# Patient Record
Sex: MALE | Race: WHITE | NOT HISPANIC OR LATINO | Employment: FULL TIME | URBAN - METROPOLITAN AREA
[De-identification: names, ages, dates, MRNs, and addresses within clinical notes are randomized per-mention and may not be internally consistent; named-entity substitution may affect disease eponyms.]

---

## 2021-04-08 DIAGNOSIS — Z23 ENCOUNTER FOR IMMUNIZATION: ICD-10-CM

## 2022-12-28 ENCOUNTER — OFFICE VISIT (OUTPATIENT)
Dept: URGENT CARE | Age: 32
End: 2022-12-28

## 2022-12-28 VITALS — OXYGEN SATURATION: 95 % | RESPIRATION RATE: 12 BRPM | TEMPERATURE: 97.8 F | HEART RATE: 106 BPM

## 2022-12-28 DIAGNOSIS — J02.9 SORE THROAT: ICD-10-CM

## 2022-12-28 DIAGNOSIS — J02.0 STREP PHARYNGITIS: Primary | ICD-10-CM

## 2022-12-28 LAB — S PYO AG THROAT QL: POSITIVE

## 2022-12-28 RX ORDER — BUPRENORPHINE AND NALOXONE 2; .5 MG/1; MG/1
FILM, SOLUBLE BUCCAL; SUBLINGUAL
COMMUNITY
Start: 2022-12-19

## 2022-12-28 RX ORDER — CLINDAMYCIN HYDROCHLORIDE 300 MG/1
300 CAPSULE ORAL 3 TIMES DAILY
Qty: 30 CAPSULE | Refills: 0 | Status: SHIPPED | OUTPATIENT
Start: 2022-12-28 | End: 2023-01-07

## 2022-12-28 NOTE — PROGRESS NOTES
[facility]        NAME: Jordana Knox III is a 34 y o  male  : 1990    MRN: [id number]  DATE: 2022  TIME: 11:01 AM    Assessment and Plan   Strep pharyngitis [J02 0]  1  Strep pharyngitis  clindamycin (CLEOCIN) 300 MG capsule      2  Sore throat  POCT rapid strepA            Patient Instructions   Clindamycin 3 times a day for 10 days  Tylenol/Motrin as needed for pain/fever  Throat lozenge salt water gargles, or honey for discomfort  Increase fluid intake   Discard toothbrush 24 hours after starting antibiotic and again after finishing antibiotics  Follow up with your PCP for worsening symptoms      Follow up with PCP in 3-5 days  Proceed to  ER if symptoms worsen  Chief Complaint     Chief Complaint   Patient presents with   • Sore Throat   • Fever     Fever on Monday and chills         History of Present Illness       Patient is a 79-year-old male presenting with sore throat, headache, and chills for the past 2 days  He had a fever of 100 5  He had 2 negative COVID test at home  He is alternating Tylenol and ibuprofen for symptom relief  He is tolerating p o  intake with difficulty swallowing  Review of Systems   Review of Systems   Constitutional: Positive for chills and fever  Negative for activity change  HENT: Positive for sore throat  Negative for congestion, rhinorrhea and sinus pain  Respiratory: Negative for cough  Gastrointestinal: Negative for abdominal pain, diarrhea, nausea and vomiting  Skin: Negative for rash  Neurological: Positive for headaches           Current Medications       Current Outpatient Medications:   •  clindamycin (CLEOCIN) 300 MG capsule, Take 1 capsule (300 mg total) by mouth 3 (three) times a day for 10 days, Disp: 30 capsule, Rfl: 0  •  buprenorphine-naloxone (Suboxone) 2-0 5 mg, , Disp: , Rfl:     Current Allergies     Allergies as of 2022 - Reviewed 2022   Allergen Reaction Noted   • Penicillins Hives 12/28/2022            The following portions of the patient's history were reviewed and updated as appropriate: allergies, current medications, past family history, past medical history, past social history, past surgical history and problem list      No past medical history on file  No past surgical history on file  No family history on file  Medications have been verified  Objective   Pulse (!) 106   Temp 97 8 °F (36 6 °C) (Temporal)   Resp 12   SpO2 95%      Rapid strep positive  Physical Exam     Physical Exam  Vitals reviewed  Constitutional:       General: He is awake  He is not in acute distress  Appearance: Normal appearance  He is normal weight  HENT:      Head: Normocephalic  Right Ear: Hearing normal       Left Ear: Hearing normal       Nose: Nose normal       Mouth/Throat:      Pharynx: Pharyngeal swelling, oropharyngeal exudate and posterior oropharyngeal erythema present  Tonsils: 2+ on the right  2+ on the left  Cardiovascular:      Rate and Rhythm: Tachycardia present  Pulmonary:      Effort: Pulmonary effort is normal       Breath sounds: Normal breath sounds  Skin:     General: Skin is warm and moist    Neurological:      General: No focal deficit present  Mental Status: He is alert and oriented to person, place, and time  Psychiatric:         Behavior: Behavior is cooperative

## 2024-02-20 ENCOUNTER — OFFICE VISIT (OUTPATIENT)
Dept: URGENT CARE | Age: 34
End: 2024-02-20
Payer: COMMERCIAL

## 2024-02-20 VITALS
SYSTOLIC BLOOD PRESSURE: 140 MMHG | TEMPERATURE: 99.7 F | HEART RATE: 88 BPM | OXYGEN SATURATION: 100 % | DIASTOLIC BLOOD PRESSURE: 88 MMHG | BODY MASS INDEX: 30.8 KG/M2 | RESPIRATION RATE: 22 BRPM | WEIGHT: 220 LBS | HEIGHT: 71 IN

## 2024-02-20 DIAGNOSIS — J02.9 SORE THROAT: Primary | ICD-10-CM

## 2024-02-20 LAB — S PYO AG THROAT QL: NEGATIVE

## 2024-02-20 PROCEDURE — 87070 CULTURE OTHR SPECIMN AEROBIC: CPT

## 2024-02-20 PROCEDURE — 99213 OFFICE O/P EST LOW 20 MIN: CPT

## 2024-02-20 PROCEDURE — 87147 CULTURE TYPE IMMUNOLOGIC: CPT

## 2024-02-20 NOTE — PROGRESS NOTES
[facility]        NAME: Tank Gonzalez III is a 33 y.o. male  : 1990    MRN: [id number]  DATE: 2024  TIME: 8:43 AM    Assessment and Plan   Sore throat [J02.9]  1. Sore throat  POCT rapid strepA    Throat culture        In office strep test negative.No abx therapy indicated at this time.  Center criteria not met. Counseled patient on supportive therapy with plenty of fluids, tea with honey, throat lozenges. Can use saline nasal irrigation.  May take Tylenol or Motrin as needed for pain.May use decongestants as directed.    Patient Instructions       Follow up with PCP in 3-5 days.  Proceed to  ER if symptoms worsen.    Chief Complaint     Chief Complaint   Patient presents with   • Sore Throat     Pt has sore throat symptoms that started yesterday. Fever and chills.          History of Present Illness       Patient is a 33-year-old male presenting with 1 day of sore throat and mild congestion.  Patient states he took Motrin with minimal relief.  He denies cough, chest pain, shortness of breath, nausea, vomiting, diarrhea.  Patient states he has 2 toddlers at home which attended  and are sick on and off.            Sore Throat   Associated symptoms include congestion. Pertinent negatives include no ear pain, shortness of breath or stridor.       Review of Systems   Review of Systems   Constitutional:  Negative for chills and fever.   HENT:  Positive for congestion and sore throat. Negative for ear pain.    Respiratory:  Negative for shortness of breath, wheezing and stridor.    All other systems reviewed and are negative.        Current Medications       Current Outpatient Medications:   •  buprenorphine-naloxone (Suboxone) 2-0.5 mg, , Disp: , Rfl:     Current Allergies     Allergies as of 2024 - Reviewed 2024   Allergen Reaction Noted   • Penicillins Hives 2022            The following portions of the patient's history were reviewed and updated as  "appropriate: allergies, current medications, past family history, past medical history, past social history, past surgical history and problem list.     No past medical history on file.    No past surgical history on file.    No family history on file.      Medications have been verified.        Objective   /88   Pulse 88   Temp 99.7 °F (37.6 °C)   Resp 22   Ht 5' 11\" (1.803 m)   Wt 99.8 kg (220 lb)   SpO2 100%   BMI 30.68 kg/m²   No LMP for male patient.       Physical Exam     Physical Exam  Vitals and nursing note reviewed.   Constitutional:       General: He is not in acute distress.     Appearance: He is well-developed. He is not ill-appearing.   HENT:      Right Ear: Tympanic membrane normal.      Left Ear: Tympanic membrane normal.      Nose: Congestion and rhinorrhea present.      Mouth/Throat:      Mouth: Mucous membranes are moist.      Pharynx: Posterior oropharyngeal erythema present. No pharyngeal swelling or oropharyngeal exudate.      Tonsils: No tonsillar exudate. 1+ on the right. 1+ on the left.   Cardiovascular:      Rate and Rhythm: Normal rate and regular rhythm.   Pulmonary:      Effort: Pulmonary effort is normal.      Breath sounds: Normal breath sounds.   Abdominal:      General: Bowel sounds are normal.      Palpations: Abdomen is soft.   Musculoskeletal:      Cervical back: Normal range of motion.   Lymphadenopathy:      Cervical: No cervical adenopathy.   Skin:     General: Skin is warm and dry.   Neurological:      Mental Status: He is alert.                   "

## 2024-02-20 NOTE — PATIENT INSTRUCTIONS
Please trial warm salt water gargles, Chloraseptic spray, Cepacol cough drops and warm tea with honey as needed for sore throat.    Nearly all of upper respiratory infections in adults are the result of viruses. These viruses include COVID, flu, RSV, adenoviruses and other coronaviruses. Antibiotics do not treat viruses and are not recommended or indicated at this time.   Take over the counter medications as needed.   Recommend over the counter decongestants as needed and age appropriate.   Neti Pot rinses and saline nasal sprays may also help clear nasal and/or sinus congestion. Frequent suctioning (before/after naps and nighttime sleep) can help symptoms in infants and young children  Recommend Mucinex to assist in the break-up of chest congestion in adults. Recommend Zarbee's cold over the counter treatments for young children (under the age of 2).   Also may consider over the counter allergy medications such as Allegra-D and Zyrtec.   If symptoms persist for 7+ days, follow-up with primary care provider or return to clinic to discuss appropriateness of antibiotics.   Vaccination is important to help prevent the spread of disease and infants. Vaccines are available for COVID and influenza for ages 6 months and older. Please discuss scheduling vaccines with your PCP.    If symptoms worsen, shortness of breath develops, you experience severe sinus pain, difficulty swallowing or changes in voice, report to the emergency department.    RSV: When It's More Than Just a Cold - [email or web address]

## 2024-02-22 ENCOUNTER — TELEPHONE (OUTPATIENT)
Dept: URGENT CARE | Age: 34
End: 2024-02-22

## 2024-02-22 DIAGNOSIS — J02.0 STREP PHARYNGITIS: Primary | ICD-10-CM

## 2024-02-22 LAB — BACTERIA THROAT CULT: ABNORMAL

## 2024-02-22 RX ORDER — AZITHROMYCIN 250 MG/1
TABLET, FILM COATED ORAL
Qty: 6 TABLET | Refills: 0 | Status: SHIPPED | OUTPATIENT
Start: 2024-02-22 | End: 2024-02-26

## 2024-02-22 NOTE — TELEPHONE ENCOUNTER
Discussed positive throat culture results with patient.  Discussed taking azithromycin as ordered.  Patient to throw away his toothbrush after 3 full days of antibiotic therapy.  Patient verbalize understanding of plan of care, all questions and concerns were addressed during this phone call.   Chronic Pain    Chronic pain is a complex condition and the Emergency Department is not the ideal place to manage this condition. Prescription painkillers must be written by your primary care provider or a pain management physician. Avoid activities or triggers that exacerbate your pain.    Please continue to take your anti-seizure medication.  Please follow up with your neurologist or primary care physician as soon as possible    SEEK IMMEDIATE MEDICAL CARE IF YOU HAVE ANY OF THE FOLLOWING SYMPTOMS: severe chest pain, fainting, vomiting blood, dark or bloody stools, or pain different from your chronic pain. Seizure lasting over 5 minutes, not waking up or persistent altered mental status after the seizure, or more frequent or worsening seizures.

## 2025-03-14 ENCOUNTER — OFFICE VISIT (OUTPATIENT)
Age: 35
End: 2025-03-14

## 2025-03-14 VITALS — TEMPERATURE: 98.2 F | WEIGHT: 229.5 LBS | BODY MASS INDEX: 32.01 KG/M2

## 2025-03-14 DIAGNOSIS — L81.4 LENTIGINES: ICD-10-CM

## 2025-03-14 DIAGNOSIS — L81.2 EPHELIS: ICD-10-CM

## 2025-03-14 DIAGNOSIS — D18.01 CHERRY ANGIOMA: ICD-10-CM

## 2025-03-14 DIAGNOSIS — D22.9 MULTIPLE MELANOCYTIC NEVI: Primary | ICD-10-CM

## 2025-03-14 DIAGNOSIS — D48.5 NEOPLASM OF UNCERTAIN BEHAVIOR OF SKIN: ICD-10-CM

## 2025-03-14 PROCEDURE — 88305 TISSUE EXAM BY PATHOLOGIST: CPT | Performed by: STUDENT IN AN ORGANIZED HEALTH CARE EDUCATION/TRAINING PROGRAM

## 2025-03-14 PROCEDURE — 88342 IMHCHEM/IMCYTCHM 1ST ANTB: CPT | Performed by: STUDENT IN AN ORGANIZED HEALTH CARE EDUCATION/TRAINING PROGRAM

## 2025-03-14 PROCEDURE — 88341 IMHCHEM/IMCYTCHM EA ADD ANTB: CPT | Performed by: STUDENT IN AN ORGANIZED HEALTH CARE EDUCATION/TRAINING PROGRAM

## 2025-03-14 NOTE — PROGRESS NOTES
"[facility] Dermatology Clinic Note     Patient Name: Tank Gonzalez III  Encounter Date: 03/14/2025     Have you been cared for by a [facility] Dermatologist in the last 3 years and, if so, which description applies to you?    NO.   I am considered a \"new\" patient and must complete all patient intake questions. I am MALE/not capable of bearing children.    REVIEW OF SYSTEMS:  Have you recently had or currently have any of the following? Recent fever or chills? No  Any non-healing wound? No   PAST MEDICAL HISTORY:  Have you personally ever had or currently have any of the following?  If \"YES,\" then please provide more detail. Skin cancer (such as Melanoma, Basal Cell Carcinoma, Squamous Cell Carcinoma?  No  Tuberculosis, HIV/AIDS, Hepatitis B or C: No  Radiation Treatment No   HISTORY OF IMMUNOSUPPRESSION:   Do you have a history of any of the following:  Systemic Immunosuppression such as Diabetes, Biologic or Immunotherapy, Chemotherapy, Organ Transplantation, Bone Marrow Transplantation or Prednisone?  No     Answering \"YES\" requires the addition of the dotphrase \"IMMUNOSUPPRESSED\" as the first diagnosis of the patient's visit.   FAMILY HISTORY:  Any \"first degree relatives\" (parent, brother, sister, or child) with the following?    Skin Cancer, Pancreatic or Other Cancer? YES, Sister- Melanoma   PATIENT EXPERIENCE:    Do you want the Dermatologist to perform a COMPLETE skin exam today including a clinical examination under the \"bra and underwear\" areas?  Yes  If necessary, do we have your permission to call and leave a detailed message on your Preferred Phone number that includes your specific medical information?  Yes      Allergies   Allergen Reactions    Penicillins Hives      Current Outpatient Medications:     buprenorphine-naloxone (Suboxone) 2-0.5 mg, , Disp: , Rfl:           Whom besides the patient is providing clinical information about today's encounter?   NO ADDITIONAL HISTORIAN (patient alone " provided history)    Physical Exam and Assessment/Plan by Diagnosis:    SEBORRHEIC KERATOSES  - Relevant exam: Scattered over the trunk/extremities are waxy brown to black plaques and papules with stuck on appearance  - Exam and clinical history consistent with seborrheic keratoses  - Counseled that these are benign growths that do not require treatment  - Counseled that removal of lesions is considered cosmetic     MELANOCYTIC NEVI  - Relevant exam: Scattered over the trunk/extremities are homogenously pigmented brown macules and papules. ELM performed and without concerning findings.  - Exam and clinical history consistent with melanocytic nevi  - Educated on the ABCDE's of melanoma  - Counseled to return to clinic prior to scheduled appointment should any of these lesions change or should any new lesions of concern arise  - Counseled on use of sun protection daily. Reviewed latest FDA sunscreen guidelines, including use of broad spectrum (UVA and UVB blocking) sunscreen or sun protective clothing with SPF 30-50 every 2-3 hours and reapplied after exposure to water; use of photoprotective clothing, including a broad brim hat and UPF rated clothing if outdoors for several hours; avoid use of tanning beds as these pose significant risk for melanoma and skin cancer.  - Follow up in 1 year    LENTIGINES  OTHER SKIN CHANGES DUE TO CHRONIC EXPOSURE TO NONIONIZING RADIATION  - Relevant exam: Over sun exposed areas are brown macules. ELM performed and without concerning findings.  - Exam and clinical history consistent with lentigines.  - Educated that these are indicative of prior sun exposure.   - Counseled to return to clinic prior to scheduled appointment should any of these lesions change or should any new lesions of concern arise.  - Recommended use of sunscreen as above and below.  - Counseled on use of sun protection daily. Reviewed latest FDA sunscreen guidelines, including use of broad spectrum (UVA and UVB  "blocking) sunscreen or sun protective clothing with SPF 30-50 every 2-3 hours and reapplied after exposure to water; use of photoprotective clothing, including a broad brim hat and UPF rated clothing if outdoors for several hours; avoid use of tanning beds as these pose significant risk for melanoma and skin cancer.    CHERRY ANGIOMAS  - Relevant exam: Scattered over the trunk/extremities are red papules  - Exam and clinical history consistent with cherry angiomas  - Educated that these are benign    EPHELIS (\"FRECKLE\")    Physical Exam:  Anatomic Location Affected:  face  Morphological Description:  brown macules   Pertinent Positives:  Pertinent Negatives:    Additional History of Present Condition:  Noted on exam.    Assessment and Plan:  Based on a thorough discussion of this condition and the management approach to it (including a comprehensive discussion of the known risks, side effects and potential benefits of treatment), the patient (family) agrees to implement the following specific plan:  Counseled that these are benign    An ephelis is a benign freckle, a small, light brown or tan vale on the skin. They are typically found on sun exposed skin such as they face or on the backside of the forearms. The freckles darken in response to the sun and fade with decreased UV exposure. There can be hundreds or thousands of them on exposed skin.    Ephelides are particularly common in fair-skinned children and are not present at birth. People with white skin that cannot tan often have red hair and numerous ephelides. Ephelides can also occur in other races that have dark brown or black hair.    Ephelides are an inherited characteristic. People with many ephelides have at least one copy of a variant MC1R gene, which is the same variant that causes red hair. Freckles in non-Caucasians are associated with a different variant of the gene.  The pigment-forming cells, melanocytes, produce more pigment than usual in " "ephelides. The pigment is packaged as melanosomes and distributed to surrounding keratinocytes.  Ephelides increase in number following exposure to ultraviolet radiation in sunlight.    Ephelides are found on sun-exposed sites, particularly the nose and cheeks.  They are prominent during summer and fade during winter.  Light to dark brown flat spots  3-10 mm in diameter  Multiple spots may merge into large patches  Any shape; often round    Ephelides are usually diagnosed by a skin examination. However, if there is uncertainty, then diagnosis can be made via skin biopsy with the following features:  An increase in pigment is noted in otherwise normal skin  There is no increase in the number of melanocytes (unlike lentigines)    As they are an inherited characteristic, they cannot be prevented. However, the summer darkening can be reduced by careful sun protection.  They can be advised to protect affected areas using broad-spectrum, spf 50+ sunscreen.  Cosmetic concealers can be applied to the freckles  Laser treatment may result in temporary lightening of the freckles    Often, ephelides become less prominent in adulthood.      NEOPLASM OF UNCERTAIN BEHAVIOR OF SKIN    Physical Exam:  (Anatomic Location); (Size and Morphological Description); (Differential Diagnosis):  A: mid upper back; 0.5 cm x 0.8 cm pink brown papule; DDX: nevus rule out atypia  B: lower back; 0.6 cm x 0.4 cm brown macules; DDX: collision of 2 nevi rule out atypical nevus  Pertinent Positives:  Pertinent Negatives:    Additional History of Present Condition:  Noted on exam.    Assessment and Plan:  I have discussed with the patient that a sample of skin via a \"skin biopsy” would be potentially helpful to further make a specific diagnosis under the microscope.  Based on a thorough discussion of this condition and the management approach to it (including a comprehensive discussion of the known risks, side effects and potential benefits of " treatment), the patient (family) agrees to implement the following specific plan:    Procedure:  Skin Biopsy.  After a thorough discussion of treatment options and risk/benefits/alternatives (including but not limited to local pain, scarring, dyspigmentation, blistering, possible superinfection, and inability to confirm a diagnosis via histopathology), verbal and written consent were obtained and portion of the rash was biopsied for tissue sample.  See below for consent that was obtained from patient and subsequent Procedure Note.    PROCEDURE TANGENTIAL (SHAVE) BIOPSY NOTE:    Performing Physician:  Dr. Cerda  Anatomic Location; Clinical Description with size (cm); Pre-Op Diagnosis:   A: mid upper back; 0.5 cm x 0.8 cm pink brown papule; DDX: nevus rule out atypia  B: lower back; 0.6 cm x 0.4 cm brown macules; DDX: collision of 2 nevi rule out atypical nevus  Post-op diagnosis: Same     Local anesthesia: 1% xylocaine with epi      Topical anesthesia: None    Hemostasis: Aluminum chloride       After obtaining informed consent  at which time there was a discussion about the purpose of biopsy  and low risks of infection and bleeding.  The area was prepped and draped in the usual fashion. Anesthesia was obtained with 1% lidocaine with epinephrine. A shave biopsy to an appropriate sampling depth was obtained by Shave (Dermablade or 15 blade) The resulting wound was covered with surgical ointment and bandaged appropriately.     The patient tolerated the procedure well without complications and was without signs of functional compromise.      Specimen has been sent for review by Dermatopathology.    Standard post-procedure care has been explained and has been included in written form within the patient's copy of Informed Consent.    INFORMED CONSENT DISCUSSION AND POST-OPERATIVE INSTRUCTIONS FOR PATIENT    I.  RATIONALE FOR PROCEDURE  I understand that a skin biopsy allows the Dermatologist to test a lesion or rash under  "the microscope to obtain a diagnosis.  It usually involves numbing the area with numbing medication and removing a small piece of skin; sometimes the area will be closed with sutures. In this specific procedure, sutures are not usually needed.  If any sutures are placed, then they are usually need to be removed in 2 weeks or less.    I understand that my Dermatologist recommends that a skin \"shave\" biopsy be performed today.  A local anesthetic, similar to the kind that a dentist uses when filling a cavity, will be injected with a very small needle into the skin area to be sampled.  The injected skin and tissue underneath \"will go to sleep” and become numb so no pain should be felt afterwards.  An instrument shaped like a tiny \"razor blade\" (shave biopsy instrument) will be used to cut a small piece of tissue and skin from the area so that a sample of tissue can be taken and examined more closely under the microscope.  A slight amount of bleeding will occur, but it will be stopped with direct pressure and a pressure bandage and any other appropriate methods.  I understands that a scar will form where the wound was created.  Surgical ointment will be applied to help protect the wound.  Sutures are not usually needed.    II.  RISKS AND POTENTIAL COMPLICATIONS   I understand the risks and potential complications of a skin biopsy include but are not limited to the following:  Bleeding  Infection  Pain  Scar/keloid  Skin discoloration  Incomplete Removal  Recurrence  Nerve Damage/Numbness/Loss of Function  Allergic Reaction to Anesthesia  Biopsies are diagnostic procedures and based on findings additional treatment or evaluation may be required  Loss or destruction of specimen resulting in no additional findings    My Dermatologist has explained to me the nature of the condition, the nature of the procedure, and the benefits to be reasonably expected compared with alternative approaches.  My Dermatologist has discussed " "the likelihood of major risks or complications of this procedure including the specific risks listed above, such as bleeding, infection, and scarring/keloid.  I understand that a scar is expected after this procedure.  I understand that my physician cannot predict if the scar will form a \"keloid,\" which extends beyond the borders of the wound that is created.  A keloid is a thick, painful, and bumpy scar.  A keloid can be difficult to treat, as it does not always respond well to therapy, which includes injecting cortisone directly into the keloid every few weeks.  While this usually reduces the pain and size of the scar, it does not eliminate it.      I understand that photographs may be taken before and after the procedure.  These will be maintained as part of the medical providers confidential records and may not be made available to me.  I further authorize the medical provider to use the photographs for teaching purposes or to illustrate scientific papers, books, or lectures if in his/her judgment, medical research, education, or science may benefit from its use.    I have had an opportunity to fully inquire about the risks and benefits of this procedure and its alternatives.   I have been given ample time and opportunity to ask questions and to seek a second opinion if I wished to do so.  I acknowledge that there have specifically been no guarantees as to the cosmetic results from the procedure.  I am aware that with any procedure there is always the possibility of an unexpected complication.    III. POST-PROCEDURAL CARE (WHAT YOU WILL NEED TO DO \"AFTER THE BIOPSY\" TO OPTIMIZE HEALING)    Keep the area clean and dry.  Try NOT to remove the bandage or get it wet for the first 24 hours.    Gently clean the area and apply surgical ointment (such as Vaseline petrolatum ointment, which is available \"over the counter\" and not a prescription) to the biopsy site for up to 2 weeks straight.  This acts to protect the " wound from the outside world.      Sutures are not usually placed in this procedure.  If any sutures were placed, return for suture removal as instructed (generally 1 week for the face, 2 weeks for the body).      Take Acetaminophen (Tylenol) for discomfort, if no contraindications.  Ibuprofen or aspirin could make bleeding worse.    Call our office immediately for signs of infection: fever, chills, increased redness, warmth, tenderness, discomfort/pain, or pus or foul smell coming from the wound.    WHAT TO DO IF THERE IS ANY BLEEDING?  If a small amount of bleeding is noticed, place a clean cloth over the area and apply firm pressure for ten minutes.  Check the wound after 10 minutes of direct pressure.  If bleeding persists, try one more time for an additional 10 minutes of direct pressure on the area.  If the bleeding becomes heavier or does not stop after the second attempt, or if you have any other questions about this procedure, then please call your [facility] Dermatologist by calling [phone] (SKIN).     I hereby acknowledge that I have reviewed and verified the site with my Dermatologist and have requested and authorized my Dermatologist to proceed with the procedure.      Scribe Attestation      I,:  Hilda Hanna MA am acting as a scribe while in the presence of the attending physician.:       I,:  Bairon Canchola MD personally performed the services described in this documentation    as scribed in my presence.:

## 2025-03-20 ENCOUNTER — RESULTS FOLLOW-UP (OUTPATIENT)
Dept: DERMATOLOGY | Facility: CLINIC | Age: 35
End: 2025-03-20

## 2025-03-20 PROCEDURE — 88341 IMHCHEM/IMCYTCHM EA ADD ANTB: CPT | Performed by: STUDENT IN AN ORGANIZED HEALTH CARE EDUCATION/TRAINING PROGRAM

## 2025-03-20 PROCEDURE — 88342 IMHCHEM/IMCYTCHM 1ST ANTB: CPT | Performed by: STUDENT IN AN ORGANIZED HEALTH CARE EDUCATION/TRAINING PROGRAM

## 2025-03-20 PROCEDURE — 88305 TISSUE EXAM BY PATHOLOGIST: CPT | Performed by: STUDENT IN AN ORGANIZED HEALTH CARE EDUCATION/TRAINING PROGRAM

## 2025-03-20 NOTE — RESULT ENCOUNTER NOTE
DERMATOPATHOLOGY RESULT NOTE    Results reviewed by ordering physician.  Called patient to personally discuss results. Discussed results with patient.       Instructions for Clinical Derm Team:   (remember to route Result Note to appropriate staff):    None    Result & Plan by Specimen:    Specimen A: benign  Plan: reassured, benign; monitor for repigmentation/changes      Specimen B: benign  Plan: reassured, benign; monitor for repigmentation/changes             Component  Ref Range & Units (hover)   Case Report  Surgical Pathology Report                         Case: [id number]                                  Authorizing Provider:  Sandra Cerda MD            Collected:           03/14/2025 1350              Ordering Location:     [facility]      Received:            03/14/2025 [facility]                                     [place]                                                                    Pathologist:           Uche Bañuelos MD                                                          Specimens:   A) - Skin, Other, A: mid upper back                                                                 B) - Skin, Other, B: lower back                                                          Final Diagnosis  A. Skin, mid upper back, shave biopsy:    Lentiginous compound dysplastic nevus with mild to moderate atypia; extends to the tissue edges (see note).    Note: SOX10, HMB45, p16, and PRAME immunostains were reviewed; significant melanocytic architectural disorder is not seen, and the lesional cells retain expression of p16 and are negative for PRAME. Multiple levels examined.         B. Skin, lower back, shave biopsy:    Lentiginous compound dysplastic nevus with mild to moderate atypia; extends to the tissue edges (see note).    Note: SOX10, HMB45, p16, and PRAME immunostains were reviewed; significant melanocytic architectural disorder is not seen, and the lesional cells retain expression of p16 and  are negative for PRAME. Multiple levels examined.       Electronically signed by Uche Bañuelos MD on 3/20/2025 at 1510 EDT